# Patient Record
Sex: MALE | Race: WHITE | NOT HISPANIC OR LATINO | Employment: OTHER | URBAN - METROPOLITAN AREA
[De-identification: names, ages, dates, MRNs, and addresses within clinical notes are randomized per-mention and may not be internally consistent; named-entity substitution may affect disease eponyms.]

---

## 2017-02-06 ENCOUNTER — OFFICE VISIT (OUTPATIENT)
Dept: NEUROSURGERY | Facility: CLINIC | Age: 67
End: 2017-02-06

## 2017-02-06 VITALS
WEIGHT: 194 LBS | HEART RATE: 68 BPM | HEIGHT: 72 IN | BODY MASS INDEX: 26.28 KG/M2 | SYSTOLIC BLOOD PRESSURE: 122 MMHG | DIASTOLIC BLOOD PRESSURE: 70 MMHG

## 2017-02-06 DIAGNOSIS — M43.16 SPONDYLOLISTHESIS AT L4-L5 LEVEL: ICD-10-CM

## 2017-02-06 DIAGNOSIS — M48.061 SPINAL STENOSIS OF LUMBAR REGION: Primary | ICD-10-CM

## 2017-02-06 DIAGNOSIS — M43.17 SPONDYLOLISTHESIS OF LUMBOSACRAL REGION: ICD-10-CM

## 2017-02-06 PROCEDURE — 99214 OFFICE O/P EST MOD 30 MIN: CPT | Performed by: NEUROLOGICAL SURGERY

## 2017-02-06 RX ORDER — HYDROCODONE BITARTRATE AND ACETAMINOPHEN 5; 325 MG/1; MG/1
.5-1 TABLET ORAL DAILY PRN
Qty: 60 TABLET | Refills: 0 | Status: SHIPPED | OUTPATIENT
Start: 2017-02-06 | End: 2017-10-16

## 2017-02-06 RX ORDER — GABAPENTIN 300 MG/1
CAPSULE ORAL
Qty: 90 CAPSULE | Refills: 3 | Status: SHIPPED | OUTPATIENT
Start: 2017-02-06 | End: 2017-06-16 | Stop reason: SDUPTHER

## 2017-02-06 NOTE — PROGRESS NOTES
Subjective   Patient ID: Miguel Navarrete is a 66 y.o. male is here today as a self referral for evaluation of back and leg pain.      The patient notes a longstanding history of back and right leg pain.      The patient notes that he was evaluated by Dr. Mcqueen at [facility], who did not recommend surgery.    He denies any treatemtn with DANIEL or PT in the past. He notes that he is currently taking Hydrocodone 5 mg PRN pain, methocarbamol 750 mg PRN pain and gabapentin 300 mg TID.    Back Pain   This is a chronic problem. The current episode started more than 1 year ago. The problem occurs constantly. The problem has been waxing and waning since onset. The pain is present in the gluteal and sacro-iliac. The quality of the pain is described as stabbing. The pain radiates to the right knee and right thigh. The pain is at a severity of 10/10. The pain is the same all the time. The symptoms are aggravated by standing. Stiffness is present in the morning and all day. Associated symptoms include leg pain, numbness and paresthesias.       The following portions of the patient's history were reviewed and updated as appropriate: allergies, current medications, past family history, past medical history, past social history, past surgical history and problem list.    Review of Systems   Musculoskeletal: Positive for back pain.   Neurological: Positive for numbness and paresthesias.   All other systems reviewed and are negative.    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  He has had 10-12 years of low back pain and radiating right leg pain that has gotten progressively worse.  Occasionally involves the left buttock and leg.  His history is consistent with neurogenic claudication.  Sitting and lying down 10 to make it better.  Standing and walking tend to make it worse.  He does have a positive shopping cart sign.  He continues to run his own business.  He had a bad flare up last fall that sent  him to the emergency room.  He has seen Dr. Dawson in the past, and surgical treatment was discussed.  He has not had epidural blocks.  He is on gabapentin but only 300 mg in the morning.  Methocarbamol is taken once a day as is a pain medication.  We discussed his lumbar MRI.  If something surgical were to be done, it would be a fairly extensive decompression and fusion given the presence of the spondylolisthesis.  Tentatively one could decompress first and watch for progression of spondylolisthesis.  Any event it would be a major operation one way or the other.  He has not had epidural blocks and he is on a relatively low dose of gabapentin.  I recommended exhausted all nonsurgical options prior to considering surgical treatment.  He is willing to do that.  Will increase his gabapentin and try some epidural blocks.  He had a myelogram on a disc which he did not bring today.  I asked him to bring it next time around.      Objective   Physical Exam   Constitutional: He is oriented to person, place, and time. He appears well-developed and well-nourished.   HENT:   Head: Normocephalic and atraumatic.   Eyes: Conjunctivae and EOM are normal. Pupils are equal, round, and reactive to light.   Fundoscopic exam:       The right eye shows no papilledema. The right eye shows venous pulsations.        The left eye shows no papilledema. The left eye shows venous pulsations.   Neck: Carotid bruit is not present.   Neurological: He is oriented to person, place, and time. He has a normal Finger-Nose-Finger Test and a normal Heel to Shin Test. Gait normal.   Reflex Scores:       Tricep reflexes are 2+ on the right side and 2+ on the left side.       Bicep reflexes are 2+ on the right side and 2+ on the left side.       Brachioradialis reflexes are 2+ on the right side and 2+ on the left side.       Patellar reflexes are 2+ on the right side and 2+ on the left side.       Achilles reflexes are 2+ on the right side and 2+ on the  left side.  Psychiatric: His speech is normal.     Neurologic Exam     Mental Status   Oriented to person, place, and time.   Registration of memory: Good recent and remote memory.   Attention: normal. Concentration: normal.   Speech: speech is normal   Level of consciousness: alert  Knowledge: consistent with education.     Cranial Nerves     CN II   Visual fields full to confrontation.   Visual acuity: normal    CN III, IV, VI   Pupils are equal, round, and reactive to light.  Extraocular motions are normal.     CN V   Facial sensation intact.   Right corneal reflex: normal  Left corneal reflex: normal    CN VII   Facial expression full, symmetric.   Right facial weakness: none  Left facial weakness: none    CN VIII   Hearing: intact    CN IX, X   Palate: symmetric    CN XI   Right sternocleidomastoid strength: normal  Left sternocleidomastoid strength: normal    CN XII   Tongue: not atrophic  Tongue deviation: none    Motor Exam   Muscle bulk: normal  Right arm tone: normal  Left arm tone: normal  Right leg tone: normal  Left leg tone: normal    Strength   Strength 5/5 except as noted.     Sensory Exam   Light touch normal.     Gait, Coordination, and Reflexes     Gait  Gait: normal    Coordination   Finger to nose coordination: normal  Heel to shin coordination: normal    Reflexes   Right brachioradialis: 2+  Left brachioradialis: 2+  Right biceps: 2+  Left biceps: 2+  Right triceps: 2+  Left triceps: 2+  Right patellar: 2+  Left patellar: 2+  Right achilles: 2+  Left achilles: 2+  Right : 2+  Left : 2+      Assessment/Plan   Independent Review of Radiographic Studies:    Lumbar MRI done in November 2016 showed rather significant spinal stenosis at L3-L4, L4-L5, L5-S1 with a degenerative spondylolisthesis at L4-L5 and L5-S1.  I agree with the report.      Medical Decision Making:    I recommended avoidance of surgical treatment if possible.  We'll try a series of lumbar epidural blocks.  I've changes  gabapentin to 300/600 and refilled his pain medications to use as needed.  I told to stop the methocarbamol.  He will bring his myelogram next time.  If all else fails, he will need to have a lumbar decompression and fusion probably from L3 to the sacrum.  Hopefully that can be avoided.      Miguel was seen today for back pain.    Diagnoses and all orders for this visit:    Spinal stenosis of lumbar region  -     Epidural Block    Spondylolisthesis at L4-L5 level  -     Epidural Block    Spondylolisthesis of lumbosacral region  -     Epidural Block    Other orders  -     HYDROcodone-acetaminophen (NORCO) 5-325 MG per tablet; Take 0.5-1 tablets by mouth Daily As Needed for moderate pain (4-6).  -     gabapentin (NEURONTIN) 300 MG capsule; Take 1 tablet in AM, 2 tablets in PM    Return in about 2 months (around 4/6/2017).

## 2017-02-28 RX ORDER — OMEPRAZOLE 40 MG/1
CAPSULE, DELAYED RELEASE ORAL
Qty: 90 CAPSULE | Refills: 1 | Status: SHIPPED | OUTPATIENT
Start: 2017-02-28 | End: 2017-06-13

## 2017-03-09 RX ORDER — ESCITALOPRAM OXALATE 20 MG/1
TABLET ORAL
Qty: 90 TABLET | Refills: 1 | Status: SHIPPED | OUTPATIENT
Start: 2017-03-09 | End: 2017-11-02 | Stop reason: SDUPTHER

## 2017-03-13 ENCOUNTER — ANESTHESIA (OUTPATIENT)
Dept: PAIN MEDICINE | Facility: HOSPITAL | Age: 67
End: 2017-03-13

## 2017-03-13 ENCOUNTER — HOSPITAL ENCOUNTER (OUTPATIENT)
Dept: PAIN MEDICINE | Facility: HOSPITAL | Age: 67
Discharge: HOME | End: 2017-03-13
Attending: NEUROLOGICAL SURGERY | Admitting: NEUROLOGICAL SURGERY

## 2017-03-13 ENCOUNTER — TRANSCRIBE ORDERS (OUTPATIENT)
Dept: PAIN MEDICINE | Facility: HOSPITAL | Age: 67
End: 2017-03-13

## 2017-03-13 ENCOUNTER — ANESTHESIA EVENT (OUTPATIENT)
Dept: PAIN MEDICINE | Facility: HOSPITAL | Age: 67
End: 2017-03-13

## 2017-03-13 ENCOUNTER — HOSPITAL ENCOUNTER (OUTPATIENT)
Dept: GENERAL RADIOLOGY | Facility: HOSPITAL | Age: 67
Discharge: HOME | End: 2017-03-13

## 2017-03-13 VITALS
HEART RATE: 70 BPM | OXYGEN SATURATION: 97 % | SYSTOLIC BLOOD PRESSURE: 143 MMHG | RESPIRATION RATE: 16 BRPM | WEIGHT: 195 LBS | BODY MASS INDEX: 26.41 KG/M2 | DIASTOLIC BLOOD PRESSURE: 78 MMHG | HEIGHT: 72 IN | TEMPERATURE: 97.6 F

## 2017-03-13 DIAGNOSIS — M43.16 SPONDYLOLISTHESIS AT L4-L5 LEVEL: ICD-10-CM

## 2017-03-13 DIAGNOSIS — R52 PAIN: ICD-10-CM

## 2017-03-13 DIAGNOSIS — M48.061 SPINAL STENOSIS OF LUMBAR REGION: Primary | ICD-10-CM

## 2017-03-13 DIAGNOSIS — M43.17 SPONDYLOLISTHESIS OF LUMBOSACRAL REGION: ICD-10-CM

## 2017-03-13 PROCEDURE — 0 IOPAMIDOL 41 % SOLUTION: Performed by: ANESTHESIOLOGY

## 2017-03-13 PROCEDURE — 77003 FLUOROGUIDE FOR SPINE INJECT: CPT

## 2017-03-13 PROCEDURE — C1755 CATHETER, INTRASPINAL: HCPCS

## 2017-03-13 PROCEDURE — 25010000002 METHYLPREDNISOLONE PER 80 MG: Performed by: ANESTHESIOLOGY

## 2017-03-13 RX ORDER — METHYLPREDNISOLONE ACETATE 80 MG/ML
80 INJECTION, SUSPENSION INTRA-ARTICULAR; INTRALESIONAL; INTRAMUSCULAR; SOFT TISSUE ONCE
Status: COMPLETED | OUTPATIENT
Start: 2017-03-13 | End: 2017-03-13

## 2017-03-13 RX ADMIN — IOPAMIDOL 10 ML: 408 INJECTION, SOLUTION INTRATHECAL at 10:56

## 2017-03-13 RX ADMIN — METHYLPREDNISOLONE ACETATE 80 MG: 80 INJECTION, SUSPENSION INTRA-ARTICULAR; INTRALESIONAL; INTRAMUSCULAR; SOFT TISSUE at 10:56
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
## 2017-03-13 NOTE — ANESTHESIA PROCEDURE NOTES
PAIN Epidural block    Patient location during procedure: pain clinic  Start Time: 3/13/2017 10:45 AM  Stop Time: 3/13/2017 10:58 AM  Indication:procedure for pain  Performed By  Anesthesiologist: JUAN FELIX  Preanesthetic Checklist  Completed: patient identified, site marked, surgical consent, pre-op evaluation, timeout performed, risks and benefits discussed and monitors and equipment checked  Additional Notes  Interlaminar epidural was performed under fluoroscopic guidance.    Diagnosis  * Spinal stenosis of lumbar region without neurogenic claudication (M48.06)  * Spondylolisthesis of lumbosacral region (M43.17)  * Lumbar radiculitis (M54.16)      Epidural Block Prep:  Pt Position:prone  Sterile Tech:cap, gloves, mask and sterile barrier  Prep:chlorhexidine gluconate and isopropyl alcohol  Monitoring:blood pressure monitoring, continuous pulse oximetry and EKG  Epidural Block Procedure:  Approach:right paramedian  Guidance: fluoroscopy  Location:lumbar  Level:4-5  Needle Type:Tuohy  Needle Gauge:20  Aspiration:negative  Medications:  Depomedrol:80 mg  Preservative Free Saline:3mL  Isovue:2mL  Comments:Epidural spread of contrast.  Post Assessment:  Dressing:occlusive dressing applied  Pt Tolerance:patient tolerated the procedure well with no apparent complications  Complications:no

## 2017-04-14 RX ORDER — BUPROPION HYDROCHLORIDE 150 MG/1
TABLET ORAL
Qty: 90 TABLET | Refills: 1 | Status: SHIPPED | OUTPATIENT
Start: 2017-04-14 | End: 2017-12-11 | Stop reason: SDUPTHER

## 2017-04-17 ENCOUNTER — ANESTHESIA EVENT (OUTPATIENT)
Dept: PAIN MEDICINE | Facility: HOSPITAL | Age: 67
End: 2017-04-17

## 2017-04-17 ENCOUNTER — ANESTHESIA (OUTPATIENT)
Dept: PAIN MEDICINE | Facility: HOSPITAL | Age: 67
End: 2017-04-17

## 2017-04-17 ENCOUNTER — HOSPITAL ENCOUNTER (OUTPATIENT)
Dept: PAIN MEDICINE | Facility: HOSPITAL | Age: 67
Discharge: HOME | End: 2017-04-17
Admitting: NEUROLOGICAL SURGERY

## 2017-04-17 ENCOUNTER — HOSPITAL ENCOUNTER (OUTPATIENT)
Dept: GENERAL RADIOLOGY | Facility: HOSPITAL | Age: 67
Discharge: HOME | End: 2017-04-17

## 2017-04-17 VITALS
OXYGEN SATURATION: 96 % | DIASTOLIC BLOOD PRESSURE: 82 MMHG | HEART RATE: 78 BPM | SYSTOLIC BLOOD PRESSURE: 109 MMHG | WEIGHT: 193 LBS | HEIGHT: 72 IN | BODY MASS INDEX: 26.14 KG/M2 | RESPIRATION RATE: 16 BRPM | TEMPERATURE: 97.9 F

## 2017-04-17 DIAGNOSIS — M48.061 SPINAL STENOSIS OF LUMBAR REGION: ICD-10-CM

## 2017-04-17 DIAGNOSIS — M43.16 SPONDYLOLISTHESIS AT L4-L5 LEVEL: ICD-10-CM

## 2017-04-17 DIAGNOSIS — R52 PAIN: ICD-10-CM

## 2017-04-17 DIAGNOSIS — M43.17 SPONDYLOLISTHESIS OF LUMBOSACRAL REGION: ICD-10-CM

## 2017-04-17 PROCEDURE — 0 IOPAMIDOL 41 % SOLUTION: Performed by: ANESTHESIOLOGY

## 2017-04-17 PROCEDURE — 25010000002 METHYLPREDNISOLONE PER 80 MG: Performed by: ANESTHESIOLOGY

## 2017-04-17 PROCEDURE — C1755 CATHETER, INTRASPINAL: HCPCS

## 2017-04-17 PROCEDURE — 77003 FLUOROGUIDE FOR SPINE INJECT: CPT

## 2017-04-17 RX ORDER — FENTANYL CITRATE 50 UG/ML
50 INJECTION, SOLUTION INTRAMUSCULAR; INTRAVENOUS
Status: DISCONTINUED | OUTPATIENT
Start: 2017-04-17 | End: 2017-04-18 | Stop reason: HOSPADM

## 2017-04-17 RX ORDER — SODIUM CHLORIDE 0.9 % (FLUSH) 0.9 %
1-10 SYRINGE (ML) INJECTION AS NEEDED
Status: DISCONTINUED | OUTPATIENT
Start: 2017-04-17 | End: 2017-04-18 | Stop reason: HOSPADM

## 2017-04-17 RX ORDER — LIDOCAINE HYDROCHLORIDE 10 MG/ML
1 INJECTION, SOLUTION INFILTRATION; PERINEURAL ONCE
Status: DISCONTINUED | OUTPATIENT
Start: 2017-04-17 | End: 2017-04-18 | Stop reason: HOSPADM

## 2017-04-17 RX ORDER — MIDAZOLAM HYDROCHLORIDE 1 MG/ML
1 INJECTION INTRAMUSCULAR; INTRAVENOUS
Status: DISCONTINUED | OUTPATIENT
Start: 2017-04-17 | End: 2017-04-18 | Stop reason: HOSPADM

## 2017-04-17 RX ORDER — METHYLPREDNISOLONE ACETATE 80 MG/ML
80 INJECTION, SUSPENSION INTRA-ARTICULAR; INTRALESIONAL; INTRAMUSCULAR; SOFT TISSUE ONCE
Status: COMPLETED | OUTPATIENT
Start: 2017-04-17 | End: 2017-04-17

## 2017-04-17 RX ADMIN — IOPAMIDOL 10 ML: 408 INJECTION, SOLUTION INTRATHECAL at 11:05

## 2017-04-17 RX ADMIN — METHYLPREDNISOLONE ACETATE 80 MG: 80 INJECTION, SUSPENSION INTRA-ARTICULAR; INTRALESIONAL; INTRAMUSCULAR; SOFT TISSUE at 11:05

## 2017-04-17 NOTE — ANESTHESIA PROCEDURE NOTES
PAIN Epidural block    Patient location during procedure: pain clinic  Indication:procedure for pain  Performed By  Anesthesiologist: CHRISTIANO COWAN  Preanesthetic Checklist  Completed: patient identified, site marked, surgical consent, pre-op evaluation, timeout performed, IV checked, risks and benefits discussed and monitors and equipment checked  Additional Notes  Post-Op Diagnosis Codes:     * Lumbar degenerative disc disease (M51.36)     * Lumbar neuritis (M54.16)  Performed under fluoroscopic guidance  Unable to achieve satisfactory epidural spread of contrast at L4-5  Epidural Block Prep:  Pt Position:prone  Sterile Tech:cap, gloves, mask and sterile barrier  Prep:chlorhexidine gluconate and isopropyl alcohol  Monitoring:blood pressure monitoring, continuous pulse oximetry and EKG  Epidural Block Procedure:  Approach:right paramedian  Guidance: fluoroscopy  Location:lumbar  Interspace: L5-S1.  Needle Type:Tuohy  Needle Gauge:20  Aspiration:negative  Medications:  Depomedrol:80  Preservative Free Saline:4mL  Isovue:5mL  Comments:Epidural spread on second attempt  Post Assessment:  Dressing:occlusive dressing applied (Band-aid)  Pt Tolerance:patient tolerated the procedure well with no apparent complications  Complications:no
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
## 2017-06-13 ENCOUNTER — OFFICE VISIT (OUTPATIENT)
Dept: INTERNAL MEDICINE | Facility: CLINIC | Age: 67
End: 2017-06-13

## 2017-06-13 VITALS
HEART RATE: 76 BPM | OXYGEN SATURATION: 97 % | RESPIRATION RATE: 16 BRPM | DIASTOLIC BLOOD PRESSURE: 80 MMHG | BODY MASS INDEX: 27.86 KG/M2 | WEIGHT: 199 LBS | TEMPERATURE: 98 F | HEIGHT: 71 IN | SYSTOLIC BLOOD PRESSURE: 140 MMHG

## 2017-06-13 DIAGNOSIS — M15.9 GENERALIZED OSTEOARTHRITIS: ICD-10-CM

## 2017-06-13 DIAGNOSIS — M43.16 SPONDYLOLISTHESIS AT L4-L5 LEVEL: ICD-10-CM

## 2017-06-13 DIAGNOSIS — M43.17 SPONDYLOLISTHESIS OF LUMBOSACRAL REGION: ICD-10-CM

## 2017-06-13 DIAGNOSIS — I45.4 IVCD (INTRAVENTRICULAR CONDUCTION DEFECT): ICD-10-CM

## 2017-06-13 DIAGNOSIS — E78.00 PURE HYPERCHOLESTEROLEMIA: Primary | ICD-10-CM

## 2017-06-13 DIAGNOSIS — N40.0 PROSTATISM: ICD-10-CM

## 2017-06-13 DIAGNOSIS — F33.42 RECURRENT MAJOR DEPRESSIVE DISORDER, IN FULL REMISSION (HCC): ICD-10-CM

## 2017-06-13 DIAGNOSIS — N42.9 PROSTATE DISEASE: ICD-10-CM

## 2017-06-13 DIAGNOSIS — M54.50 CHRONIC MIDLINE LOW BACK PAIN WITHOUT SCIATICA: ICD-10-CM

## 2017-06-13 DIAGNOSIS — E53.8 COBALAMIN DEFICIENCY: ICD-10-CM

## 2017-06-13 DIAGNOSIS — K21.00 GASTROESOPHAGEAL REFLUX DISEASE WITH ESOPHAGITIS: ICD-10-CM

## 2017-06-13 DIAGNOSIS — G89.29 CHRONIC MIDLINE LOW BACK PAIN WITHOUT SCIATICA: ICD-10-CM

## 2017-06-13 DIAGNOSIS — M48.061 SPINAL STENOSIS OF LUMBAR REGION: ICD-10-CM

## 2017-06-13 DIAGNOSIS — R73.9 HYPERGLYCEMIA: ICD-10-CM

## 2017-06-13 PROCEDURE — 93000 ELECTROCARDIOGRAM COMPLETE: CPT | Performed by: INTERNAL MEDICINE

## 2017-06-13 PROCEDURE — 99215 OFFICE O/P EST HI 40 MIN: CPT | Performed by: INTERNAL MEDICINE

## 2017-06-13 RX ORDER — RANITIDINE 300 MG/1
300 TABLET ORAL NIGHTLY
Qty: 90 TABLET | Refills: 3 | Status: SHIPPED | OUTPATIENT
Start: 2017-06-13 | End: 2018-07-20 | Stop reason: SDUPTHER

## 2017-06-13 NOTE — PATIENT INSTRUCTIONS
1.  Continue usual medicines and supplements - as listed.    2.  Stop hydrocodone - start Tylenol 500 - 2 tablets in the morning and as needed for pain.    3.  For more severe pain - call the office as needed - for tramadol.    4.  Swim or run in a pool for 45 minutes - 2 days with 3 days weekly - build endurance and aerobic capacity.    5.  Do gentle stretching exercises against the wall - 5 minutes every morning - build low back posture as well as foot and calf flexibility.    6.  Avoid excessive standing, bending, lifting - protect your back.    7.  Follow a low-calorie diabetic diet - low in salt and low in sugar.    8.  Return in October - fasting checkup.
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
## 2017-06-14 LAB
ALBUMIN SERPL-MCNC: 4.5 G/DL (ref 3.2–4.8)
ALBUMIN/GLOB SERPL: 1.5 G/DL (ref 1.5–2.5)
ALP SERPL-CCNC: 75 U/L (ref 25–100)
ALT SERPL-CCNC: 33 U/L (ref 7–40)
APPEARANCE UR: CLEAR
AST SERPL-CCNC: 25 U/L (ref 0–33)
BACTERIA #/AREA URNS HPF: ABNORMAL /HPF
BASOPHILS # BLD AUTO: 0.05 10*3/MM3 (ref 0–0.2)
BASOPHILS NFR BLD AUTO: 1.3 % (ref 0–1)
BILIRUB SERPL-MCNC: 0.4 MG/DL (ref 0.3–1.2)
BILIRUB UR QL STRIP: NEGATIVE
BUN SERPL-MCNC: 11 MG/DL (ref 9–23)
BUN/CREAT SERPL: 13.8 (ref 7–25)
CALCIUM SERPL-MCNC: 9.9 MG/DL (ref 8.7–10.4)
CASTS URNS MICRO: ABNORMAL
CHLORIDE SERPL-SCNC: 107 MMOL/L (ref 99–109)
CHOLEST SERPL-MCNC: 200 MG/DL (ref 0–200)
CO2 SERPL-SCNC: 26 MMOL/L (ref 20–31)
COLOR UR: YELLOW
CREAT SERPL-MCNC: 0.8 MG/DL (ref 0.6–1.3)
CRP SERPL-MCNC: 0.64 MG/DL (ref 0–1)
EOSINOPHIL # BLD AUTO: 0.17 10*3/MM3 (ref 0.1–0.3)
EOSINOPHIL NFR BLD AUTO: 4.4 % (ref 0–3)
EPI CELLS #/AREA URNS HPF: ABNORMAL /HPF
ERYTHROCYTE [DISTWIDTH] IN BLOOD BY AUTOMATED COUNT: 14.2 % (ref 11.3–14.5)
GLOBULIN SER CALC-MCNC: 3.1 GM/DL
GLUCOSE SERPL-MCNC: 118 MG/DL (ref 70–100)
GLUCOSE UR QL: NEGATIVE
HBA1C MFR BLD: 6.3 % (ref 4.8–5.6)
HCT VFR BLD AUTO: 46.5 % (ref 38.9–50.9)
HDLC SERPL-MCNC: 60 MG/DL (ref 40–60)
HGB BLD-MCNC: 15.4 G/DL (ref 13.1–17.5)
HGB UR QL STRIP: NEGATIVE
IMM GRANULOCYTES # BLD: 0.01 10*3/MM3 (ref 0–0.03)
IMM GRANULOCYTES NFR BLD: 0.3 % (ref 0–0.6)
KETONES UR QL STRIP: NEGATIVE
LDLC SERPL CALC-MCNC: 122 MG/DL (ref 0–100)
LEUKOCYTE ESTERASE UR QL STRIP: ABNORMAL
LYMPHOCYTES # BLD AUTO: 1.13 10*3/MM3 (ref 0.6–4.8)
LYMPHOCYTES NFR BLD AUTO: 29.5 % (ref 24–44)
MCH RBC QN AUTO: 32.5 PG (ref 27–31)
MCHC RBC AUTO-ENTMCNC: 33.1 G/DL (ref 32–36)
MCV RBC AUTO: 98.1 FL (ref 80–99)
MONOCYTES # BLD AUTO: 0.36 10*3/MM3 (ref 0–1)
MONOCYTES NFR BLD AUTO: 9.4 % (ref 0–12)
NEUTROPHILS # BLD AUTO: 2.11 10*3/MM3 (ref 1.5–8.3)
NEUTROPHILS NFR BLD AUTO: 55.1 % (ref 41–71)
NITRITE UR QL STRIP: NEGATIVE
PH UR STRIP: 6 [PH] (ref 5–8)
PLATELET # BLD AUTO: 179 10*3/MM3 (ref 150–450)
POTASSIUM SERPL-SCNC: 4.9 MMOL/L (ref 3.5–5.5)
PROT SERPL-MCNC: 7.6 G/DL (ref 5.7–8.2)
PROT UR QL STRIP: NEGATIVE
PSA SERPL-MCNC: 1.48 NG/ML (ref 0–4)
RBC # BLD AUTO: 4.74 10*6/MM3 (ref 4.2–5.76)
RBC #/AREA URNS HPF: ABNORMAL /HPF
SODIUM SERPL-SCNC: 142 MMOL/L (ref 132–146)
SP GR UR: 1.01 (ref 1–1.03)
TRIGL SERPL-MCNC: 91 MG/DL (ref 0–150)
TSH SERPL DL<=0.005 MIU/L-ACNC: 1.47 MIU/ML (ref 0.35–5.35)
UROBILINOGEN UR STRIP-MCNC: ABNORMAL MG/DL
VLDLC SERPL CALC-MCNC: 18.2 MG/DL
WBC # BLD AUTO: 3.83 10*3/MM3 (ref 3.5–10.8)
WBC #/AREA URNS HPF: ABNORMAL /HPF

## 2017-06-16 RX ORDER — GABAPENTIN 300 MG/1
CAPSULE ORAL
Qty: 90 CAPSULE | Refills: 3 | Status: SHIPPED | OUTPATIENT
Start: 2017-06-16 | End: 2017-07-27 | Stop reason: SDUPTHER

## 2017-06-20 ENCOUNTER — TELEPHONE (OUTPATIENT)
Dept: INTERNAL MEDICINE | Facility: CLINIC | Age: 67
End: 2017-06-20

## 2017-06-20 NOTE — TELEPHONE ENCOUNTER
Msg left  - per TGF labs all fine except Ha1c up at 6.3 and , almost diabetic, need to tighten up on diabetic diet and improve on aerobic activity every week, may need to start on cholesterol medication soon.

## 2017-07-25 RX ORDER — GABAPENTIN 300 MG/1
CAPSULE ORAL
Qty: 90 CAPSULE | Refills: 3 | Status: CANCELLED | OUTPATIENT
Start: 2017-07-25

## 2017-07-27 RX ORDER — GABAPENTIN 300 MG/1
CAPSULE ORAL
Qty: 270 CAPSULE | Refills: 2
Start: 2017-07-27 | End: 2018-02-13 | Stop reason: SDUPTHER

## 2017-10-16 ENCOUNTER — OFFICE VISIT (OUTPATIENT)
Dept: INTERNAL MEDICINE | Facility: CLINIC | Age: 67
End: 2017-10-16

## 2017-10-16 ENCOUNTER — HOSPITAL ENCOUNTER (OUTPATIENT)
Dept: GENERAL RADIOLOGY | Facility: HOSPITAL | Age: 67
Discharge: HOME | End: 2017-10-16
Attending: INTERNAL MEDICINE | Admitting: INTERNAL MEDICINE

## 2017-10-16 ENCOUNTER — LAB REQUISITION (OUTPATIENT)
Dept: LAB | Facility: HOSPITAL | Age: 67
End: 2017-10-16

## 2017-10-16 VITALS
DIASTOLIC BLOOD PRESSURE: 90 MMHG | HEART RATE: 72 BPM | RESPIRATION RATE: 16 BRPM | BODY MASS INDEX: 27.34 KG/M2 | SYSTOLIC BLOOD PRESSURE: 156 MMHG | WEIGHT: 196 LBS | TEMPERATURE: 98.6 F | OXYGEN SATURATION: 97 %

## 2017-10-16 DIAGNOSIS — Z00.00 ROUTINE GENERAL MEDICAL EXAMINATION AT A HEALTH CARE FACILITY: ICD-10-CM

## 2017-10-16 DIAGNOSIS — M15.9 GENERALIZED OSTEOARTHRITIS: ICD-10-CM

## 2017-10-16 DIAGNOSIS — K21.00 GASTROESOPHAGEAL REFLUX DISEASE WITH ESOPHAGITIS: ICD-10-CM

## 2017-10-16 DIAGNOSIS — M54.50 CHRONIC MIDLINE LOW BACK PAIN WITHOUT SCIATICA: ICD-10-CM

## 2017-10-16 DIAGNOSIS — G89.29 CHRONIC MIDLINE LOW BACK PAIN WITHOUT SCIATICA: ICD-10-CM

## 2017-10-16 DIAGNOSIS — M79.671 RIGHT FOOT PAIN: ICD-10-CM

## 2017-10-16 DIAGNOSIS — R73.9 HYPERGLYCEMIA: ICD-10-CM

## 2017-10-16 DIAGNOSIS — E78.00 PURE HYPERCHOLESTEROLEMIA: Primary | ICD-10-CM

## 2017-10-16 DIAGNOSIS — M43.16 SPONDYLOLISTHESIS AT L4-L5 LEVEL: ICD-10-CM

## 2017-10-16 PROBLEM — M43.17 SPONDYLOLISTHESIS OF LUMBOSACRAL REGION: Status: RESOLVED | Noted: 2017-02-06 | Resolved: 2017-10-16

## 2017-10-16 LAB
ALBUMIN SERPL-MCNC: 4.6 G/DL (ref 3.2–4.8)
ALBUMIN/GLOB SERPL: 1.4 G/DL (ref 1.5–2.5)
ALP SERPL-CCNC: 72 U/L (ref 25–100)
ALT SERPL-CCNC: 47 U/L (ref 7–40)
AST SERPL-CCNC: 36 U/L (ref 0–33)
BILIRUB SERPL-MCNC: 0.5 MG/DL (ref 0.3–1.2)
BUN SERPL-MCNC: 11 MG/DL (ref 9–23)
BUN/CREAT SERPL: 12.2 (ref 7–25)
CALCIUM SERPL-MCNC: 9.5 MG/DL (ref 8.7–10.4)
CHLORIDE SERPL-SCNC: 104 MMOL/L (ref 99–109)
CHOLEST SERPL-MCNC: 202 MG/DL (ref 0–200)
CO2 SERPL-SCNC: 28 MMOL/L (ref 20–31)
CREAT SERPL-MCNC: 0.9 MG/DL (ref 0.6–1.3)
GLOBULIN SER CALC-MCNC: 3.2 GM/DL
GLUCOSE SERPL-MCNC: 110 MG/DL (ref 70–100)
HBA1C MFR BLD: 6.2 % (ref 4.8–5.6)
HDLC SERPL-MCNC: 88 MG/DL (ref 40–60)
LDLC SERPL CALC-MCNC: 97 MG/DL (ref 0–100)
POTASSIUM SERPL-SCNC: 4.9 MMOL/L (ref 3.5–5.5)
PROT SERPL-MCNC: 7.8 G/DL (ref 5.7–8.2)
SODIUM SERPL-SCNC: 138 MMOL/L (ref 132–146)
TRIGL SERPL-MCNC: 85 MG/DL (ref 0–150)
VLDLC SERPL CALC-MCNC: 17 MG/DL

## 2017-10-16 PROCEDURE — 73630 X-RAY EXAM OF FOOT: CPT

## 2017-10-16 PROCEDURE — G0008 ADMIN INFLUENZA VIRUS VAC: HCPCS | Performed by: INTERNAL MEDICINE

## 2017-10-16 PROCEDURE — 99214 OFFICE O/P EST MOD 30 MIN: CPT | Performed by: INTERNAL MEDICINE

## 2017-10-16 PROCEDURE — 90662 IIV NO PRSV INCREASED AG IM: CPT | Performed by: INTERNAL MEDICINE

## 2017-10-16 PROCEDURE — 36415 COLL VENOUS BLD VENIPUNCTURE: CPT | Performed by: INTERNAL MEDICINE

## 2017-10-16 NOTE — PROGRESS NOTES
Subjective   Miguel Navarrete is a 67 y.o. male.     History of Present Illness     The patient presented earlier this year with severe lumbar spondylosis with chronic back pain and leg pains.  He has received 2 epidural injections and feels that he 75% improved.  He is back to most activities but does protect his back.  He feels intermittent right knee pain and right foot pain.  The foot pain has become progressively difficult and limited walking.  He continues gabapentin but has come off of hydrocodone.    The following portions of the patient's history were reviewed and updated as appropriate: allergies, current medications, past family history, past medical history, past social history, past surgical history and problem list.    Review of Systems   Constitutional: Negative for appetite change and fatigue.   HENT: Negative for ear pain and sore throat.    Respiratory: Negative for cough and shortness of breath.    Cardiovascular: Negative for chest pain and palpitations.   Gastrointestinal: Negative for abdominal pain and nausea.   Musculoskeletal: Positive for back pain and gait problem. Negative for arthralgias.        Progressive right knee pain and foot pain.   Neurological: Negative for dizziness and headaches.   Psychiatric/Behavioral: Positive for dysphoric mood and sleep disturbance.        Depression well managed on current medication.       Objective   Blood pressure 156/90, pulse 72, temperature 98.6 °F (37 °C), temperature source Oral, resp. rate 16, weight 196 lb (88.9 kg), SpO2 97 %.    Physical Exam   Constitutional: He is oriented to person, place, and time. He appears well-developed and well-nourished. No distress.   Neck: Normal range of motion. Neck supple. No JVD present.   Cardiovascular: Normal rate, regular rhythm and normal heart sounds.    Pulmonary/Chest: Effort normal and breath sounds normal. He has no wheezes. He has no rales.   Abdominal: Soft. Bowel sounds are normal. He exhibits no  distension. There is no tenderness.   Musculoskeletal: Normal range of motion. He exhibits no edema.   Moderate tenderness in right lateral mid foot at proximal fourth metatarsal.  Moderate paralumbar tightness tenderness   Lymphadenopathy:     He has no cervical adenopathy.   Neurological: He is alert and oriented to person, place, and time. He exhibits normal muscle tone. Coordination normal.   Psychiatric: He has a normal mood and affect. His behavior is normal. Judgment and thought content normal.   Nursing note and vitals reviewed.    Procedures  Assessment/Plan   Miguel was seen today for foot injury.    Diagnoses and all orders for this visit:    Pure hypercholesterolemia  -     Comprehensive Metabolic Panel  -     Lipid Panel    Hyperglycemia  -     Hemoglobin A1c    Spondylolisthesis at L4-L5 level    Generalized osteoarthritis    Chronic midline low back pain without sciatica    Gastroesophageal reflux disease with esophagitis    Right foot pain  -     XR Foot 3+ View Right    Other orders  -     Flu Vaccine High Dose PF 65YR+ ([id number])    The patient has moderate tenderness in the right mid foot at the proximal fourth metatarsal.  This likely has some degree of tendinitis perhaps by limping.  He has become more active and is favoring the right leg creating a strain.  I've asked him to improve his foot wear support.    The patient had a severe flare of lumbar spondylosis issue year and is markedly improved with rest and with epidural injections.  I've encouraged him to work out particularly with aquatic aerobics to build strength and flexibility in his back.    The patient has many years of clinical depression improved on Wellbutrin and Lexapro.  He should try a low-dose of Lexapro especially since he is relying on gabapentin.    The patient has prediabetes and mild hyperlipidemia.  He may eventually benefit from aspirin therapy and Lipitor.      Patient Instructions   1.  X-ray of right foot - now.    2.   Find specialist for colonoscopy - in [facility].    3.  Visit eye doctor for checkup - October or November.    4.  Continue low-calorie diabetic diet - low in salt and low in sugar.    5.  Goal weight by next visit - 190 pounds.    6.  Use stationary cycle and swimming pool for aerobic conditioning - 3 days to 6 days weekly.    7.  Return visit February - fasting checkup and wellness exam.    8.  Laboratory tests pending at time of dictation.    9.  Consider new trial of Lexapro 10 mg daily.    Electronically signed Andrew Saenz M.D.10/16/2017 5:58 PM

## 2017-10-16 NOTE — PATIENT INSTRUCTIONS
1.  X-ray of right foot - now.    2.  Find specialist for colonoscopy - in [facility].    3.  Visit eye doctor for checkup - October or November.    4.  Continue low-calorie diabetic diet - low in salt and low in sugar.    5.  Goal weight by next visit - 190 pounds.    6.  Use stationary cycle and swimming pool for aerobic conditioning - 3 days to 6 days weekly.    7.  Return visit February - fasting checkup and wellness exam.

## 2017-10-18 ENCOUNTER — TELEPHONE (OUTPATIENT)
Dept: INTERNAL MEDICINE | Facility: CLINIC | Age: 67
End: 2017-10-18

## 2017-10-18 NOTE — TELEPHONE ENCOUNTER
Left message with pt re: lab results.  Per [facility] 6.2 so continue to watch sugar/carb intake.  All other labs stable.  Enc to call if questions or concerns.

## 2017-11-02 RX ORDER — ESCITALOPRAM OXALATE 20 MG/1
TABLET ORAL
Qty: 90 TABLET | Refills: 1 | Status: SHIPPED | OUTPATIENT
Start: 2017-11-02 | End: 2018-03-19 | Stop reason: SDUPTHER

## 2017-12-11 RX ORDER — BUPROPION HYDROCHLORIDE 150 MG/1
TABLET ORAL
Qty: 90 TABLET | Refills: 1 | Status: SHIPPED | OUTPATIENT
Start: 2017-12-11 | End: 2018-04-19 | Stop reason: SDUPTHER

## 2018-01-29 RX ORDER — GABAPENTIN 300 MG/1
CAPSULE ORAL
Qty: 270 CAPSULE | Refills: 1 | OUTPATIENT
Start: 2018-01-29

## 2018-02-07 ENCOUNTER — OFFICE VISIT (OUTPATIENT)
Dept: INTERNAL MEDICINE | Facility: CLINIC | Age: 68
End: 2018-02-07

## 2018-02-07 ENCOUNTER — LAB REQUISITION (OUTPATIENT)
Dept: LAB | Facility: HOSPITAL | Age: 68
End: 2018-02-07

## 2018-02-07 VITALS
DIASTOLIC BLOOD PRESSURE: 80 MMHG | SYSTOLIC BLOOD PRESSURE: 150 MMHG | WEIGHT: 200 LBS | TEMPERATURE: 98.1 F | HEART RATE: 82 BPM | BODY MASS INDEX: 28 KG/M2 | HEIGHT: 71 IN | RESPIRATION RATE: 18 BRPM | OXYGEN SATURATION: 97 %

## 2018-02-07 DIAGNOSIS — F33.42 RECURRENT MAJOR DEPRESSIVE DISORDER, IN FULL REMISSION (HCC): ICD-10-CM

## 2018-02-07 DIAGNOSIS — L40.9 PSORIASIS: ICD-10-CM

## 2018-02-07 DIAGNOSIS — Z00.00 ROUTINE GENERAL MEDICAL EXAMINATION AT A HEALTH CARE FACILITY: ICD-10-CM

## 2018-02-07 DIAGNOSIS — E78.00 PURE HYPERCHOLESTEROLEMIA: ICD-10-CM

## 2018-02-07 DIAGNOSIS — R73.9 HYPERGLYCEMIA: ICD-10-CM

## 2018-02-07 DIAGNOSIS — E78.00 PURE HYPERCHOLESTEROLEMIA: Primary | ICD-10-CM

## 2018-02-07 DIAGNOSIS — M54.50 CHRONIC MIDLINE LOW BACK PAIN WITHOUT SCIATICA: ICD-10-CM

## 2018-02-07 DIAGNOSIS — G89.29 CHRONIC MIDLINE LOW BACK PAIN WITHOUT SCIATICA: ICD-10-CM

## 2018-02-07 DIAGNOSIS — M48.061 SPINAL STENOSIS OF LUMBAR REGION WITHOUT NEUROGENIC CLAUDICATION: ICD-10-CM

## 2018-02-07 DIAGNOSIS — K21.00 GASTROESOPHAGEAL REFLUX DISEASE WITH ESOPHAGITIS: ICD-10-CM

## 2018-02-07 PROBLEM — M79.671 RIGHT FOOT PAIN: Status: RESOLVED | Noted: 2017-10-16 | Resolved: 2018-02-07

## 2018-02-07 LAB
ALBUMIN SERPL-MCNC: 4.6 G/DL (ref 3.2–4.8)
ALBUMIN/GLOB SERPL: 1.5 G/DL (ref 1.5–2.5)
ALP SERPL-CCNC: 71 U/L (ref 25–100)
ALT SERPL-CCNC: 59 U/L (ref 7–40)
AST SERPL-CCNC: 37 U/L (ref 0–33)
BILIRUB SERPL-MCNC: 0.6 MG/DL (ref 0.3–1.2)
BUN SERPL-MCNC: 13 MG/DL (ref 9–23)
BUN/CREAT SERPL: 14.4 (ref 7–25)
CALCIUM SERPL-MCNC: 9.6 MG/DL (ref 8.7–10.4)
CHLORIDE SERPL-SCNC: 102 MMOL/L (ref 99–109)
CHOLEST SERPL-MCNC: 218 MG/DL (ref 0–200)
CO2 SERPL-SCNC: 28 MMOL/L (ref 20–31)
CREAT SERPL-MCNC: 0.9 MG/DL (ref 0.6–1.3)
GFR SERPLBLD CREATININE-BSD FMLA CKD-EPI: 101 ML/MIN/1.73
GFR SERPLBLD CREATININE-BSD FMLA CKD-EPI: 84 ML/MIN/1.73
GLOBULIN SER CALC-MCNC: 3.1 GM/DL
GLUCOSE SERPL-MCNC: 116 MG/DL (ref 70–100)
HBA1C MFR BLD: 6.2 % (ref 4.8–5.6)
HDLC SERPL-MCNC: 64 MG/DL (ref 40–60)
LDLC SERPL CALC-MCNC: 138 MG/DL (ref 0–100)
POTASSIUM SERPL-SCNC: 4.3 MMOL/L (ref 3.5–5.5)
PROT SERPL-MCNC: 7.7 G/DL (ref 5.7–8.2)
SODIUM SERPL-SCNC: 138 MMOL/L (ref 132–146)
TRIGL SERPL-MCNC: 81 MG/DL (ref 0–150)
VLDLC SERPL CALC-MCNC: 16.2 MG/DL

## 2018-02-07 PROCEDURE — 36415 COLL VENOUS BLD VENIPUNCTURE: CPT | Performed by: INTERNAL MEDICINE

## 2018-02-07 PROCEDURE — 96160 PT-FOCUSED HLTH RISK ASSMT: CPT | Performed by: INTERNAL MEDICINE

## 2018-02-07 PROCEDURE — 99214 OFFICE O/P EST MOD 30 MIN: CPT | Performed by: INTERNAL MEDICINE

## 2018-02-07 PROCEDURE — G0439 PPPS, SUBSEQ VISIT: HCPCS | Performed by: INTERNAL MEDICINE

## 2018-02-07 NOTE — PROGRESS NOTES
QUICK REFERENCE INFORMATION:  The ABCs of the Annual Wellness Visit    Subsequent Medicare Wellness Visit    HEALTH RISK ASSESSMENT    1950    Recent Hospitalizations:  No hospitalization(s) within the last year..        Current Medical Providers:  Patient Care Team:  Andrew Saenz MD as PCP - General  Andrew Saenz MD as PCP - Family Medicine        Smoking Status:  History   Smoking Status   • Never Smoker   Smokeless Tobacco   • Never Used       Alcohol Consumption:  History   Alcohol Use   • 1.8 - 2.4 oz/week   • 3 - 4 Glasses of wine per week       Depression Screen:   PHQ-2/PHQ-9 Depression Screening 2/7/2018   Little interest or pleasure in doing things 0   Feeling down, depressed, or hopeless 0   Total Score 0       Health Habits and Functional and Cognitive Screening:  Functional & Cognitive Status 2/7/2018   Do you have difficulty preparing food and eating? No   Do you have difficulty bathing yourself, getting dressed or grooming yourself? No   Do you have difficulty using the toilet? No   Do you have difficulty moving around from place to place? No   Do you have trouble with steps or getting out of a bed or a chair? No   In the past year have you fallen or experienced a near fall? No   Current Diet Diabetic Diet   Dental Exam Up to date   Eye Exam Up to date   Exercise (times per week) 2 times per week   Current Exercise Activities Include Walking   Do you need help using the phone?  No   Are you deaf or do you have serious difficulty hearing?  No   Do you need help with transportation? No   Do you need help shopping? No   Do you need help preparing meals?  No   Do you need help with housework?  No   Do you need help with laundry? No   Do you need help taking your medications? No   Do you need help managing money? No   Have you felt unusual stress, anger or loneliness in the last month? No   Who do you live with? Spouse   If you need help, do you have trouble finding someone available to  you? No   Have you been bothered in the last four weeks by sexual problems? No   Do you have difficulty concentrating, remembering or making decisions? No           Does the patient have evidence of cognitive impairment? No    Aspirin use counseling: Does not need ASA (and currently is not on it)      Recent Lab Results:  CMP:  Lab Results   Component Value Date     (H) 10/16/2017    BUN 11 10/16/2017    CREATININE 0.90 10/16/2017    EGFRIFNONA 84 10/16/2017    EGFRIFAFRI 101 10/16/2017    BCR 12.2 10/16/2017     10/16/2017    K 4.9 10/16/2017    CO2 28.0 10/16/2017    CALCIUM 9.5 10/16/2017    PROTENTOTREF 7.8 10/16/2017    ALBUMIN 4.60 10/16/2017    LABGLOBREF 3.2 10/16/2017    LABIL2 1.4 (L) 10/16/2017    BILITOT 0.5 10/16/2017    ALKPHOS 72 10/16/2017    AST 36 (H) 10/16/2017    ALT 47 (H) 10/16/2017     Lipid Panel:  Lab Results   Component Value Date    TRIG 85 10/16/2017    HDL 88 (H) 10/16/2017    VLDL 17 10/16/2017    LDLDIRECT 163 (H) 06/03/2016     HbA1c:  Lab Results   Component Value Date    HGBA1C 6.20 (H) 10/16/2017       Visual Acuity:  No exam data present    Age-appropriate Screening Schedule:  Refer to the list below for future screening recommendations based on patient's age, sex and/or medical conditions. Orders for these recommended tests are listed in the plan section. The patient has been provided with a written plan.    Health Maintenance   Topic Date Due   • PNEUMOCOCCAL VACCINES (65+ LOW/MEDIUM RISK) (2 of 2 - PCV13) 06/04/2015   • COLONOSCOPY  06/03/2016   • LIPID PANEL  10/16/2018   • TDAP/TD VACCINES (2 - Td) 04/08/2020   • INFLUENZA VACCINE  Completed   • ZOSTER VACCINE  Completed        Subjective   History of Present Illness    Miguel Navarrete is a 67 y.o. male who presents for an Subsequent Wellness Visit.    The following portions of the patient's history were reviewed and updated as appropriate: allergies, current medications, past family history, past medical history,  past social history, past surgical history and problem list.    Outpatient Medications Prior to Visit   Medication Sig Dispense Refill   • buPROPion XL (WELLBUTRIN XL) 150 MG 24 hr tablet TAKE 1 TABLET EVERY DAY 90 tablet 1   • escitalopram (LEXAPRO) 20 MG tablet TAKE 1 TABLET EVERY DAY 90 tablet 1   • gabapentin (NEURONTIN) 300 MG capsule TAKE 1 TABLET QAM AND 2 TABLETS  capsule 2   • Multiple Vitamin (MULTIVITAMINS PO) Take 1 tablet by mouth daily.     • Omega-3 Fatty Acids (FISH OIL) 1000 MG capsule capsule Take 2 capsules by mouth daily.     • raNITIdine (ZANTAC) 300 MG tablet Take 1 tablet by mouth Every Night. 90 tablet 3   • vitamin B-12 (CYANOCOBALAMIN) 2500 MCG sublingual tablet tablet Place 1 tablet under the tongue every morning.       No facility-administered medications prior to visit.        Patient Active Problem List   Diagnosis   • Atopic rhinitis   • Depression   • Gastroesophageal reflux disease with esophagitis   • Generalized osteoarthritis   • Hyperglycemia   • Hyperlipidemia   • Knee pain   • Low back pain   • Spinal stenosis of lumbar region   • Prostatism   • Cobalamin deficiency   • IVCD (intraventricular conduction defect)   • Preventative health care   • Neuralgia of right inguinal region   • Spondylolisthesis at L4-L5 level   • Right foot pain       Advance Care Planning:  has an advance directive - a copy has been provided and is in file    Identification of Risk Factors:  Risk factors include: weight , unhealthy diet, cardiovascular risk, inactivity, chronic pain and polypharmacy.    Review of Systems    Compared to one year ago, the patient feels his physical health is the same.  Compared to one year ago, the patient feels his mental health is the same.    Objective     Physical Exam    Vitals:    02/07/18 1023   BP: 150/80   BP Location: Right arm   Patient Position: Sitting   Cuff Size: Adult   Pulse: 82   Resp: 18   Temp: 98.1 °F (36.7 °C)   TempSrc: Oral   SpO2: 97%  "  Weight: 90.7 kg (200 lb)   Height: 180.3 cm (71\")   PainSc:   2       Body mass index is 27.89 kg/(m^2).  Discussed the patient's BMI with him. BMI is above normal parameters. Follow-up plan includes:  exercise counseling and nutrition counseling.    Assessment/Plan   Patient Self-Management and Personalized Health Advice  The patient has been provided with information about: diet, exercise, weight management and prevention of cardiac or vascular disease and preventive services including:   · Exercise counseling provided, Fall Risk assessment done, Nutrition counseling provided.    Visit Diagnoses:  No diagnosis found.    No orders of the defined types were placed in this encounter.      Outpatient Encounter Prescriptions as of 2/7/2018   Medication Sig Dispense Refill   • buPROPion XL (WELLBUTRIN XL) 150 MG 24 hr tablet TAKE 1 TABLET EVERY DAY 90 tablet 1   • escitalopram (LEXAPRO) 20 MG tablet TAKE 1 TABLET EVERY DAY 90 tablet 1   • gabapentin (NEURONTIN) 300 MG capsule TAKE 1 TABLET QAM AND 2 TABLETS  capsule 2   • Multiple Vitamin (MULTIVITAMINS PO) Take 1 tablet by mouth daily.     • Omega-3 Fatty Acids (FISH OIL) 1000 MG capsule capsule Take 2 capsules by mouth daily.     • raNITIdine (ZANTAC) 300 MG tablet Take 1 tablet by mouth Every Night. 90 tablet 3   • vitamin B-12 (CYANOCOBALAMIN) 2500 MCG sublingual tablet tablet Place 1 tablet under the tongue every morning.       No facility-administered encounter medications on file as of 2/7/2018.        Reviewed use of high risk medication in the elderly: yes  Reviewed for potential of harmful drug interactions in the elderly: yes    Follow Up:  No Follow-up on file.     An After Visit Summary and PPPS with all of these plans were given to the patient.        The wellness exam has been reviewed in detail.  The patient has been fully counseled on preventative guidelines for vaccines, cancer screenings, and other health maintenance needs.  Functional " testing has been performed to assess capacity for independent living and need for other medical interventions.   The patient was counseled on maintaining a lifestyle to promote good health and to minimize chronic diseases.  The patient has been assisted with scheduling healthcare procedures for the coming year and given a written document outlining these recommendations.    Electronically signed Andrew Saenz M.D.2/10/2018 8:46 AM

## 2018-02-07 NOTE — PATIENT INSTRUCTIONS
1.  Follow a low-calorie diabetic diet - bring weight down - 190 pounds by December.    2.  Continue regular back exercises - every morning - for strengthening and posture.    3.  Consider spine stimulator with neurosurgeon - for progressive back pain.    4.  Walk daily - for physical fitness.    5.  Next visit 4 months - annual checkup fasting.

## 2018-02-10 PROBLEM — L30.9 ECZEMA: Status: ACTIVE | Noted: 2018-02-10

## 2018-02-10 PROBLEM — L40.9 PSORIASIS: Status: ACTIVE | Noted: 2018-02-10

## 2018-02-10 RX ORDER — TRIAMCINOLONE ACETONIDE 1 MG/G
CREAM TOPICAL 2 TIMES DAILY PRN
Qty: 80 G | Refills: 1 | Status: SHIPPED | OUTPATIENT
Start: 2018-02-10

## 2018-02-10 NOTE — PROGRESS NOTES
"Liyah Navarrete is a 67 y.o. male.     History of Present Illness     The patient has mild hyperlipidemia and has become prediabetic in recent years.  He has never been on prescription medications for either condition but has been on low-calorie diabetic diet.  His body weight has generally been 195-200 - for many years.  His mother did have heart failure but apparently this was related to chronic lung disease.  The patient's never used tobacco products.    The following portions of the patient's history were reviewed and updated as appropriate: allergies, current medications, past family history, past medical history, past social history, past surgical history and problem list.    Review of Systems   Constitutional: Negative for appetite change and fatigue.   HENT: Negative for ear pain and sore throat.    Respiratory: Negative for cough and shortness of breath.    Cardiovascular: Negative for chest pain and palpitations.   Gastrointestinal: Negative for abdominal pain and nausea.   Musculoskeletal: Positive for back pain. Negative for arthralgias and gait problem.        Recurrent episodes of low back pain.   Skin:        Recurring patches of eczema.   Neurological: Negative for dizziness and headaches.   Psychiatric/Behavioral: Positive for dysphoric mood. Negative for sleep disturbance. The patient is not nervous/anxious.         Dysphoric mood is markedly improved on bupropion and Lexapro.       Objective   Blood pressure 150/80, pulse 82, temperature 98.1 °F (36.7 °C), temperature source Oral, resp. rate 18, height 180.3 cm (71\"), weight 90.7 kg (200 lb), SpO2 97 %.    Physical Exam   Constitutional: He is oriented to person, place, and time. He appears well-developed and well-nourished. No distress.   Cardiovascular: Normal rate, regular rhythm and normal heart sounds.    Pulmonary/Chest: Effort normal and breath sounds normal. He has no wheezes. He has no rales.   Abdominal: Soft. Bowel sounds " are normal. He exhibits no distension and no mass. There is no tenderness.   Musculoskeletal: Normal range of motion. He exhibits no edema.   Moderate paralumbar tightness tenderness   Neurological: He is alert and oriented to person, place, and time. He exhibits normal muscle tone. Coordination normal.   Skin: Skin is warm and dry.   Mild patches of psoriasis  on arms and legs   Psychiatric: He has a normal mood and affect. His behavior is normal. Judgment and thought content normal.   Nursing note and vitals reviewed.    Procedures  Assessment/Plan   Miguel was seen today for annual exam and hyperlipidemia.    Diagnoses and all orders for this visit:    Pure hypercholesterolemia  -     Comprehensive Metabolic Panel  -     Lipid Panel    Gastroesophageal reflux disease with esophagitis    Hyperglycemia  -     Hemoglobin A1c    Spinal stenosis of lumbar region without neurogenic claudication    Chronic midline low back pain without sciatica    Recurrent major depressive disorder, in full remission    Psoriasis    Other orders  -     triamcinolone (KENALOG) 0.1 % cream; Apply  topically 2 (Two) Times a Day As Needed for Rash. Do not apply to face      The patient does have mild hyperlipidemia and a growing risk for atherosclerotic disease.  Starting Lipitor for now would be a reasonable decision.  The patient needs a more disciplined diet and lifestyle to control his blood sugars and bring his weight 190 pounds.    The patient is prediabetic with a hemoglobin A1c of 6.2%.  A low-calorie diabetic diet is his best form of treatment.    The patient's had a profound history of depression in the last 20 years associated with a change in his career.  He has greatly stabilized on Lexapro and bupropion.  He was followed on one agent for many years and after progressive dysphoria he is done much better on both agents.     The patient has significant low backache associated with lumbar spondylosis.  He did improve with  injection therapy one year ago.  I've encouraged him to continue back exercises to promote strength, flexibility, and posture.  I've also encouraged him to see the neurosurgeon again for close monitoring of his condition and referral to a pain specialist as needed.    The wellness exam has been reviewed in detail.  The patient has been fully counseled on preventative guidelines for vaccines, cancer screenings, and other health maintenance needs.  Functional testing has been performed to assess capacity for independent living and need for other medical interventions.   The patient was counseled on maintaining a lifestyle to promote good health and to minimize chronic diseases.  The patient has been assisted with scheduling healthcare procedures for the coming year and given a written document outlining these recommendations.    Patient Instructions   1.  Follow a low-calorie diabetic diet - bring weight down - 190 pounds by December.    2.  Continue regular back exercises - every morning - for strengthening and posture.    3.  Consider spine stimulator with neurosurgeon - for progressive back pain.    4.  Walk daily - for physical fitness.    5.  Next visit 4 months - annual checkup fasting.    6.  LDL cholesterol mildly elevated 138.  Consider atorvastatin 10 mg daily to bring LDL below 100.    7.  Hemoglobin A1c 6.2% indicates persistent prediabetes.  Continue low-calorie diabetic diet for weight of 190.    8.  Chemistry panel is acceptable requires no change in treatment.    Electronically signed Andrew Saenz M.D.2/10/2018 8:54 AM

## 2018-02-13 ENCOUNTER — TELEPHONE (OUTPATIENT)
Dept: INTERNAL MEDICINE | Facility: CLINIC | Age: 68
End: 2018-02-13

## 2018-02-13 RX ORDER — GABAPENTIN 300 MG/1
300 CAPSULE ORAL
Qty: 450 CAPSULE | Refills: 1 | OUTPATIENT
Start: 2018-02-13

## 2018-02-13 NOTE — TELEPHONE ENCOUNTER
Pt is taking gabapentin 300 mg - 1 in the am and 2 at night, which isn't giving him much relief. He said TGF discussed last week about increasing it. He is out and needs a refill regardless.  Per TGF, may increase it to 5/day and take it how he likes, such as 1 tid+2 hs or 2 am+1 midday+2 hs. Advised to make changes if any a week at a time. RX for #450 w 1 RF called. Pt verb understanding.

## 2018-02-14 ENCOUNTER — TELEPHONE (OUTPATIENT)
Dept: INTERNAL MEDICINE | Facility: CLINIC | Age: 68
End: 2018-02-14

## 2018-02-14 NOTE — TELEPHONE ENCOUNTER
Left message re: labs.  Per TGF -  LDL cholesterol mildly elevated 138.  Consider atorvastatin 10 mg daily to bring LDL below 100.  Hemoglobin A1c 6.2% indicates persistent prediabetes.  Continue low-calorie diabetic diet for weight of 190.  Chemistry panel is acceptable requires no change in treatment.  Enc to call if questions or concerns.

## 2018-03-19 RX ORDER — ESCITALOPRAM OXALATE 20 MG/1
TABLET ORAL
Qty: 90 TABLET | Refills: 1 | Status: SHIPPED | OUTPATIENT
Start: 2018-03-19

## 2018-04-20 RX ORDER — BUPROPION HYDROCHLORIDE 150 MG/1
TABLET ORAL
Qty: 90 TABLET | Refills: 1 | Status: SHIPPED | OUTPATIENT
Start: 2018-04-20

## 2018-07-20 RX ORDER — RANITIDINE 300 MG/1
TABLET ORAL
Qty: 90 TABLET | Refills: 1 | Status: SHIPPED | OUTPATIENT
Start: 2018-07-20 | End: 2019-01-10 | Stop reason: SDUPTHER

## 2018-09-19 RX ORDER — BUPROPION HYDROCHLORIDE 150 MG/1
TABLET ORAL
Qty: 90 TABLET | Refills: 1 | OUTPATIENT
Start: 2018-09-19

## 2019-01-10 RX ORDER — RANITIDINE 300 MG/1
300 TABLET ORAL
Qty: 30 TABLET | Refills: 0 | Status: SHIPPED | OUTPATIENT
Start: 2019-01-10

## 2019-01-14 RX ORDER — RANITIDINE 300 MG/1
TABLET ORAL
Qty: 90 TABLET | Refills: 0 | OUTPATIENT
Start: 2019-01-14

## 2021-03-09 DIAGNOSIS — Z23 IMMUNIZATION DUE: ICD-10-CM
